# Patient Record
Sex: FEMALE | Race: WHITE | Employment: OTHER
[De-identification: names, ages, dates, MRNs, and addresses within clinical notes are randomized per-mention and may not be internally consistent; named-entity substitution may affect disease eponyms.]

---

## 2017-03-01 ENCOUNTER — OFFICE VISIT (OUTPATIENT)
Facility: CLINIC | Age: 62
End: 2017-03-01

## 2017-03-01 VITALS
RESPIRATION RATE: 16 BRPM | TEMPERATURE: 98.1 F | SYSTOLIC BLOOD PRESSURE: 120 MMHG | DIASTOLIC BLOOD PRESSURE: 80 MMHG | HEART RATE: 75 BPM | OXYGEN SATURATION: 99 %

## 2017-03-01 DIAGNOSIS — J02.9 SORE THROAT: Primary | ICD-10-CM

## 2017-03-01 LAB — S PYO AG THROAT QL: NORMAL

## 2017-03-01 PROCEDURE — 87880 STREP A ASSAY W/OPTIC: CPT | Performed by: NURSE PRACTITIONER

## 2017-03-01 PROCEDURE — 99213 OFFICE O/P EST LOW 20 MIN: CPT | Performed by: NURSE PRACTITIONER

## 2017-03-01 RX ORDER — DOXYCYCLINE HYCLATE 100 MG
100 TABLET ORAL 2 TIMES DAILY
Qty: 20 TABLET | Refills: 0 | Status: SHIPPED | OUTPATIENT
Start: 2017-03-01 | End: 2018-05-31 | Stop reason: SDUPTHER

## 2017-03-01 RX ORDER — DOXYCYCLINE HYCLATE 100 MG
100 TABLET ORAL 2 TIMES DAILY
Qty: 20 TABLET | Refills: 0 | Status: SHIPPED | OUTPATIENT
Start: 2017-03-01 | End: 2017-03-01

## 2017-11-28 ENCOUNTER — HOSPITAL ENCOUNTER (OUTPATIENT)
Age: 62
Setting detail: SPECIMEN
Discharge: HOME | End: 2017-11-28
Payer: COMMERCIAL

## 2017-11-28 ENCOUNTER — HOSPITAL ENCOUNTER (OUTPATIENT)
Dept: MRI IMAGING | Facility: CLINIC | Age: 62
Discharge: HOME | End: 2017-11-28
Payer: COMMERCIAL

## 2017-11-28 DIAGNOSIS — E78.00 PURE HYPERCHOLESTEROLEMIA: ICD-10-CM

## 2017-11-28 DIAGNOSIS — E03.9 HYPOTHYROIDISM, UNSPECIFIED TYPE: ICD-10-CM

## 2017-11-28 DIAGNOSIS — D70.9 NEUTROPENIA, UNSPECIFIED TYPE (HCC): ICD-10-CM

## 2017-11-28 DIAGNOSIS — M25.552 LEFT HIP PAIN: ICD-10-CM

## 2017-11-28 LAB
ALBUMIN SERPL-MCNC: 4.2 G/DL (ref 3.5–5.2)
ALBUMIN/GLOBULIN RATIO: 1.4 (ref 1–2.5)
ALP BLD-CCNC: 45 U/L (ref 35–104)
ALT SERPL-CCNC: 12 U/L (ref 5–33)
ANION GAP SERPL CALCULATED.3IONS-SCNC: 8 MMOL/L (ref 9–17)
AST SERPL-CCNC: 14 U/L
BILIRUB SERPL-MCNC: 0.42 MG/DL (ref 0.3–1.2)
BUN BLDV-MCNC: 17 MG/DL (ref 8–23)
BUN/CREAT BLD: ABNORMAL (ref 9–20)
CALCIUM SERPL-MCNC: 9.2 MG/DL (ref 8.6–10.4)
CHLORIDE BLD-SCNC: 101 MMOL/L (ref 98–107)
CHOLESTEROL/HDL RATIO: 3.2
CHOLESTEROL: 187 MG/DL
CO2: 29 MMOL/L (ref 20–31)
CREAT SERPL-MCNC: 0.82 MG/DL (ref 0.5–0.9)
GFR AFRICAN AMERICAN: >60 ML/MIN
GFR NON-AFRICAN AMERICAN: >60 ML/MIN
GFR SERPL CREATININE-BSD FRML MDRD: ABNORMAL ML/MIN/{1.73_M2}
GFR SERPL CREATININE-BSD FRML MDRD: ABNORMAL ML/MIN/{1.73_M2}
GLUCOSE BLD-MCNC: 86 MG/DL (ref 70–99)
HCT VFR BLD CALC: 40.1 % (ref 36.3–47.1)
HDLC SERPL-MCNC: 58 MG/DL
HEMOGLOBIN: 12.3 G/DL (ref 11.9–15.1)
LDL CHOLESTEROL: 114 MG/DL (ref 0–130)
MCH RBC QN AUTO: 28.9 PG (ref 25.2–33.5)
MCHC RBC AUTO-ENTMCNC: 30.7 G/DL (ref 28.4–34.8)
MCV RBC AUTO: 94.4 FL (ref 82.6–102.9)
PDW BLD-RTO: 13 % (ref 11.8–14.4)
PLATELET # BLD: 352 K/UL (ref 138–453)
PMV BLD AUTO: 9.4 FL (ref 8.1–13.5)
POTASSIUM SERPL-SCNC: 4.3 MMOL/L (ref 3.7–5.3)
RBC # BLD: 4.25 M/UL (ref 3.95–5.11)
SODIUM BLD-SCNC: 138 MMOL/L (ref 135–144)
THYROXINE, FREE: 1.39 NG/DL (ref 0.93–1.7)
TOTAL PROTEIN: 7.3 G/DL (ref 6.4–8.3)
TRIGL SERPL-MCNC: 73 MG/DL
TSH SERPL DL<=0.05 MIU/L-ACNC: 7.1 MIU/L (ref 0.3–5)
VLDLC SERPL CALC-MCNC: NORMAL MG/DL (ref 1–30)
WBC # BLD: 4.9 K/UL (ref 3.5–11.3)

## 2017-11-28 PROCEDURE — 73721 MRI JNT OF LWR EXTRE W/O DYE: CPT

## 2017-11-28 PROCEDURE — 72148 MRI LUMBAR SPINE W/O DYE: CPT

## 2017-12-22 ENCOUNTER — HOSPITAL ENCOUNTER (OUTPATIENT)
Dept: WOMENS IMAGING | Age: 62
Discharge: HOME | End: 2017-12-22
Payer: COMMERCIAL

## 2017-12-22 DIAGNOSIS — Z12.31 VISIT FOR SCREENING MAMMOGRAM: ICD-10-CM

## 2017-12-22 PROCEDURE — 77063 BREAST TOMOSYNTHESIS BI: CPT

## 2018-01-04 ENCOUNTER — OFFICE VISIT (OUTPATIENT)
Dept: ORTHOPEDIC SURGERY | Age: 63
End: 2018-01-04
Payer: COMMERCIAL

## 2018-01-04 VITALS
HEIGHT: 66 IN | HEART RATE: 71 BPM | BODY MASS INDEX: 24.91 KG/M2 | WEIGHT: 155 LBS | DIASTOLIC BLOOD PRESSURE: 87 MMHG | SYSTOLIC BLOOD PRESSURE: 133 MMHG | RESPIRATION RATE: 18 BRPM

## 2018-01-04 DIAGNOSIS — M25.552 LEFT HIP PAIN: Primary | ICD-10-CM

## 2018-01-04 PROCEDURE — 99203 OFFICE O/P NEW LOW 30 MIN: CPT | Performed by: ORTHOPAEDIC SURGERY

## 2018-01-12 ENCOUNTER — HOSPITAL ENCOUNTER (OUTPATIENT)
Dept: INTERVENTIONAL RADIOLOGY/VASCULAR | Age: 63
Discharge: HOME | End: 2018-01-12
Payer: COMMERCIAL

## 2018-01-12 DIAGNOSIS — M25.552 LEFT HIP PAIN: ICD-10-CM

## 2018-01-12 PROCEDURE — 77002 NEEDLE LOCALIZATION BY XRAY: CPT

## 2018-01-12 PROCEDURE — 20610 DRAIN/INJ JOINT/BURSA W/O US: CPT

## 2018-01-12 PROCEDURE — 6360000004 HC RX CONTRAST MEDICATION: Performed by: ORTHOPAEDIC SURGERY

## 2018-01-12 PROCEDURE — 2500000003 HC RX 250 WO HCPCS: Performed by: ORTHOPAEDIC SURGERY

## 2018-01-12 PROCEDURE — 6360000002 HC RX W HCPCS: Performed by: ORTHOPAEDIC SURGERY

## 2018-01-12 RX ORDER — BUPIVACAINE HYDROCHLORIDE 5 MG/ML
2 INJECTION, SOLUTION EPIDURAL; INTRACAUDAL ONCE
Status: COMPLETED | OUTPATIENT
Start: 2018-01-12 | End: 2018-01-12

## 2018-01-12 RX ORDER — LIDOCAINE HYDROCHLORIDE 10 MG/ML
2 INJECTION, SOLUTION EPIDURAL; INFILTRATION; INTRACAUDAL; PERINEURAL ONCE
Status: COMPLETED | OUTPATIENT
Start: 2018-01-12 | End: 2018-01-12

## 2018-01-12 RX ORDER — METHYLPREDNISOLONE ACETATE 80 MG/ML
80 INJECTION, SUSPENSION INTRA-ARTICULAR; INTRALESIONAL; INTRAMUSCULAR; SOFT TISSUE ONCE
Status: COMPLETED | OUTPATIENT
Start: 2018-01-12 | End: 2018-01-12

## 2018-01-12 RX ADMIN — IOPAMIDOL 50 ML: 510 INJECTION, SOLUTION INTRAVASCULAR at 15:43

## 2018-01-12 RX ADMIN — METHYLPREDNISOLONE ACETATE 80 MG: 80 INJECTION, SUSPENSION INTRA-ARTICULAR; INTRALESIONAL; INTRAMUSCULAR; SOFT TISSUE at 15:25

## 2018-01-12 RX ADMIN — BUPIVACAINE HYDROCHLORIDE 10 MG: 5 INJECTION, SOLUTION EPIDURAL; INTRACAUDAL at 15:25

## 2018-01-12 RX ADMIN — LIDOCAINE HYDROCHLORIDE 2 ML: 10 INJECTION, SOLUTION EPIDURAL; INFILTRATION; INTRACAUDAL; PERINEURAL at 15:25

## 2018-01-12 NOTE — BRIEF OP NOTE
Brief Postoperative Note    Delmi Salas  YOB: 1955  [id number]    Pre-operative Diagnosis: Left hip pain     Post-operative Diagnosis: Same    Procedure: Fluoroscopic guided left hip injection     Anesthesia: Local    Surgeons/Assistants: Whitley Maynard MD    Estimated Blood Loss: less than 50     Complications: None    Specimens: Was Not Obtained    Findings: 1 mL of Depo-Medrol 80 mg/mL, 2 mL of xylocaine 1% and 2 mL of Bupivacaine 0.5 % injected into the left hip joint     Electronically signed by Whitley Maynard MD on 1/12/2018 at 3:32 PM

## 2018-02-20 ENCOUNTER — HOSPITAL ENCOUNTER (OUTPATIENT)
Age: 63
Setting detail: SPECIMEN
Discharge: HOME | End: 2018-02-20
Payer: COMMERCIAL

## 2018-02-20 DIAGNOSIS — E03.9 HYPOTHYROIDISM, UNSPECIFIED TYPE: ICD-10-CM

## 2018-02-20 LAB
THYROXINE, FREE: 1.49 NG/DL (ref 0.93–1.7)
TSH SERPL DL<=0.05 MIU/L-ACNC: 1.62 MIU/L (ref 0.3–5)

## 2019-02-22 ENCOUNTER — HOSPITAL ENCOUNTER (OUTPATIENT)
Age: 64
Setting detail: SPECIMEN
Discharge: HOME | End: 2019-02-22
Payer: COMMERCIAL

## 2019-02-22 DIAGNOSIS — Z13.9 SCREENING FOR CONDITION: ICD-10-CM

## 2019-02-22 DIAGNOSIS — D70.9 NEUTROPENIA, UNSPECIFIED TYPE (HCC): ICD-10-CM

## 2019-02-22 DIAGNOSIS — E78.00 PURE HYPERCHOLESTEROLEMIA: ICD-10-CM

## 2019-02-22 DIAGNOSIS — E03.9 HYPOTHYROIDISM, UNSPECIFIED TYPE: ICD-10-CM

## 2019-02-22 LAB
ALBUMIN SERPL-MCNC: 4.2 G/DL (ref 3.5–5.2)
ALBUMIN/GLOBULIN RATIO: 1.3 (ref 1–2.5)
ALP BLD-CCNC: 48 U/L (ref 35–104)
ALT SERPL-CCNC: 12 U/L (ref 5–33)
ANION GAP SERPL CALCULATED.3IONS-SCNC: 13 MMOL/L (ref 9–17)
AST SERPL-CCNC: 14 U/L
BILIRUB SERPL-MCNC: 0.36 MG/DL (ref 0.3–1.2)
BUN BLDV-MCNC: 20 MG/DL (ref 8–23)
BUN/CREAT BLD: NORMAL (ref 9–20)
CALCIUM SERPL-MCNC: 9.4 MG/DL (ref 8.6–10.4)
CHLORIDE BLD-SCNC: 104 MMOL/L (ref 98–107)
CHOLESTEROL/HDL RATIO: 3.1
CHOLESTEROL: 197 MG/DL
CO2: 24 MMOL/L (ref 20–31)
CREAT SERPL-MCNC: 0.8 MG/DL (ref 0.5–0.9)
ESTIMATED AVERAGE GLUCOSE: 100 MG/DL
GFR AFRICAN AMERICAN: >60 ML/MIN
GFR NON-AFRICAN AMERICAN: >60 ML/MIN
GFR SERPL CREATININE-BSD FRML MDRD: NORMAL ML/MIN/{1.73_M2}
GFR SERPL CREATININE-BSD FRML MDRD: NORMAL ML/MIN/{1.73_M2}
GLUCOSE BLD-MCNC: 84 MG/DL (ref 70–99)
HBA1C MFR BLD: 5.1 % (ref 4–6)
HCT VFR BLD CALC: 40.5 % (ref 36.3–47.1)
HDLC SERPL-MCNC: 63 MG/DL
HEMOGLOBIN: 13 G/DL (ref 11.9–15.1)
LDL CHOLESTEROL: 123 MG/DL (ref 0–130)
MCH RBC QN AUTO: 30.4 PG (ref 25.2–33.5)
MCHC RBC AUTO-ENTMCNC: 32.1 G/DL (ref 28.4–34.8)
MCV RBC AUTO: 94.8 FL (ref 82.6–102.9)
NRBC AUTOMATED: 0 PER 100 WBC
PDW BLD-RTO: 13.2 % (ref 11.8–14.4)
PLATELET # BLD: 357 K/UL (ref 138–453)
PMV BLD AUTO: 9.6 FL (ref 8.1–13.5)
POTASSIUM SERPL-SCNC: 4.3 MMOL/L (ref 3.7–5.3)
RBC # BLD: 4.27 M/UL (ref 3.95–5.11)
SODIUM BLD-SCNC: 141 MMOL/L (ref 135–144)
THYROXINE, FREE: 1.57 NG/DL (ref 0.93–1.7)
TOTAL PROTEIN: 7.4 G/DL (ref 6.4–8.3)
TRIGL SERPL-MCNC: 54 MG/DL
TSH SERPL DL<=0.05 MIU/L-ACNC: 1.7 MIU/L (ref 0.3–5)
VLDLC SERPL CALC-MCNC: NORMAL MG/DL (ref 1–30)
WBC # BLD: 4.4 K/UL (ref 3.5–11.3)

## 2019-02-26 ENCOUNTER — HOSPITAL ENCOUNTER (OUTPATIENT)
Dept: GENERAL RADIOLOGY | Facility: CLINIC | Age: 64
Discharge: HOME | End: 2019-02-28
Payer: COMMERCIAL

## 2019-02-26 ENCOUNTER — HOSPITAL ENCOUNTER (OUTPATIENT)
Facility: CLINIC | Age: 64
Discharge: HOME | End: 2019-02-28
Payer: COMMERCIAL

## 2019-02-26 DIAGNOSIS — Z13.9 SCREENING FOR CONDITION: ICD-10-CM

## 2019-02-26 PROBLEM — G47.00 INSOMNIA: Status: ACTIVE | Noted: 2019-02-26

## 2019-02-26 PROCEDURE — 71046 X-RAY EXAM CHEST 2 VIEWS: CPT

## 2019-11-07 ENCOUNTER — HOSPITAL ENCOUNTER (OUTPATIENT)
Age: 64
Setting detail: SPECIMEN
Discharge: HOME | End: 2019-11-07
Payer: COMMERCIAL

## 2019-11-07 DIAGNOSIS — E03.9 HYPOTHYROIDISM, UNSPECIFIED TYPE: ICD-10-CM

## 2019-11-07 LAB
THYROXINE, FREE: 1.42 NG/DL (ref 0.93–1.7)
TSH SERPL DL<=0.05 MIU/L-ACNC: 4.74 MIU/L (ref 0.3–5)

## 2020-03-04 ENCOUNTER — HOSPITAL ENCOUNTER (OUTPATIENT)
Age: 65
Setting detail: SPECIMEN
Discharge: HOME | End: 2020-03-04
Payer: COMMERCIAL

## 2020-03-04 LAB
ALBUMIN SERPL-MCNC: 4.1 G/DL (ref 3.5–5.2)
ALBUMIN/GLOBULIN RATIO: 1.2 (ref 1–2.5)
ALP BLD-CCNC: 45 U/L (ref 35–104)
ALT SERPL-CCNC: 12 U/L (ref 5–33)
ANION GAP SERPL CALCULATED.3IONS-SCNC: 10 MMOL/L (ref 9–17)
AST SERPL-CCNC: 13 U/L
BILIRUB SERPL-MCNC: 0.3 MG/DL (ref 0.3–1.2)
BUN BLDV-MCNC: 20 MG/DL (ref 8–23)
BUN/CREAT BLD: NORMAL (ref 9–20)
CALCIUM SERPL-MCNC: 9.7 MG/DL (ref 8.6–10.4)
CHLORIDE BLD-SCNC: 103 MMOL/L (ref 98–107)
CHOLESTEROL/HDL RATIO: 3.3
CHOLESTEROL: 197 MG/DL
CO2: 27 MMOL/L (ref 20–31)
CREAT SERPL-MCNC: 0.73 MG/DL (ref 0.5–0.9)
GFR AFRICAN AMERICAN: >60 ML/MIN
GFR NON-AFRICAN AMERICAN: >60 ML/MIN
GFR SERPL CREATININE-BSD FRML MDRD: NORMAL ML/MIN/{1.73_M2}
GFR SERPL CREATININE-BSD FRML MDRD: NORMAL ML/MIN/{1.73_M2}
GLUCOSE BLD-MCNC: 84 MG/DL (ref 70–99)
HCT VFR BLD CALC: 41.4 % (ref 36.3–47.1)
HDLC SERPL-MCNC: 59 MG/DL
HEMOGLOBIN: 12.9 G/DL (ref 11.9–15.1)
LDL CHOLESTEROL: 121 MG/DL (ref 0–130)
MCH RBC QN AUTO: 29.4 PG (ref 25.2–33.5)
MCHC RBC AUTO-ENTMCNC: 31.2 G/DL (ref 28.4–34.8)
MCV RBC AUTO: 94.3 FL (ref 82.6–102.9)
NRBC AUTOMATED: 0 PER 100 WBC
PDW BLD-RTO: 13.1 % (ref 11.8–14.4)
PLATELET # BLD: 365 K/UL (ref 138–453)
PMV BLD AUTO: 9.6 FL (ref 8.1–13.5)
POTASSIUM SERPL-SCNC: 4.5 MMOL/L (ref 3.7–5.3)
RBC # BLD: 4.39 M/UL (ref 3.95–5.11)
SODIUM BLD-SCNC: 140 MMOL/L (ref 135–144)
THYROXINE, FREE: 1.42 NG/DL (ref 0.93–1.7)
TOTAL PROTEIN: 7.5 G/DL (ref 6.4–8.3)
TRIGL SERPL-MCNC: 87 MG/DL
TSH SERPL DL<=0.05 MIU/L-ACNC: 4.85 MIU/L (ref 0.3–5)
VLDLC SERPL CALC-MCNC: NORMAL MG/DL (ref 1–30)
WBC # BLD: 4.3 K/UL (ref 3.5–11.3)

## 2020-05-13 ENCOUNTER — OFFICE VISIT (OUTPATIENT)
Dept: PODIATRY | Age: 65
End: 2020-05-13
Payer: COMMERCIAL

## 2020-05-13 VITALS — TEMPERATURE: 97.5 F | BODY MASS INDEX: 24.11 KG/M2 | HEIGHT: 66 IN | WEIGHT: 150 LBS

## 2020-05-13 PROCEDURE — L3020 FOOT LONGITUD/METATARSAL SUP: HCPCS | Performed by: PODIATRIST

## 2020-05-13 PROCEDURE — 99203 OFFICE O/P NEW LOW 30 MIN: CPT | Performed by: PODIATRIST

## 2020-05-13 RX ORDER — LEVOTHYROXINE SODIUM 112 MCG
TABLET ORAL
COMMUNITY
Start: 2020-03-07 | End: 2020-06-16 | Stop reason: SDUPTHER

## 2020-05-13 RX ORDER — METHYLPREDNISOLONE 4 MG/1
TABLET ORAL
Qty: 1 TABLET | Refills: 0 | Status: SHIPPED | OUTPATIENT
Start: 2020-05-13 | End: 2021-09-16 | Stop reason: SDUPTHER

## 2020-05-13 NOTE — PROGRESS NOTES
[address] [facility] [address] [id number] [facility] [address]  Dept: [phone]    NEW PATIENT PROGRESS NOTE  Date of patient's visit: 5/13/2020  Patient's Name:  Anai Oh YOB: 1955            Patient Care Team:  DEE Blackmon CNP as PCP - General (Nurse Practitioner)  DEE Blackmon CNP as PCP - [facility]  Salvador Elizondo MD as Consulting Physician (Gynecology)        Chief Complaint   Patient presents with    New Patient    Foot Pain     bl heel          HPI:   Anai Oh is a 63 y.o. female who presents to the office today complaining of bilateral heel pain . Symptoms began 2 month(s) ago. Patient relates pain is Present. Pain is rated 2 out of 10 and is described as constant. Treatments prior to today's visit include: none. Currently denies F/C/N/V. Pt's primary care physician is DEE Blackmon CNP last seen November 7 2019 patient states she is also having pain at the ball of her feet as well. 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 She states her feet have been hurting more now that she is off of work. She states she also is having some numbness and tingling in both feet. Allergies   Allergen Reactions    Bee Venom      Other reaction(s): Unknown    Fexofenadine-Pseudoephed Er Palpitations       Past Medical History:   Diagnosis Date    Allergic rhinitis     Anemia     iron deficiency    Anxiety     ESBL (extended spectrum beta-lactamase) producing bacteria infection 8/15/2014    E. Coli urine    Hyperlipidemia     mild    Hypothyroidism     Insomnia     MVP (mitral valve prolapse)     Osteopenia     Postmenopausal        Prior to Admission medications    Medication Sig Start Date End Date Taking?  Authorizing Provider   SYNTHROID 112 MCG tablet  3/7/20  Yes Historical Provider, MD   temazepam (RESTORIL) 15 MG capsule Take 1 capsule by mouth nightly as needed over the object to massage the bottom of your foot. 4. If you want to do ice massage, fill a water bottle about three-fourths of the way full and freeze before using. 5. Continue for 2 to 5 minutes. Bilateral calf stretch (knees straight)    1. Place a book on the floor a few inches from a wall or countertop, and put the balls of your feet on it. Your heels should be on the floor. The book needs to be thick enough so that you can feel a gentle stretch in each calf. If you are not steady on your feet, hold on to a chair, counter, or wall while you do this stretch. 2. Keep your knees straight, and lean forward until you feel a stretch in each calf. 3. To get more stretch, add another book or use a thicker book, such as a phone book, a dictionary, or an encyclopedia. 4. Hold the stretch for at least 15 to 30 seconds. 5. Repeat 2 to 4 times. Bilateral calf stretch (knees bent)    1. Place a book on the floor a few inches from a wall or countertop, and put the balls of your feet on it. Your heels should be on the floor. The book needs to be thick enough so that you can feel a gentle stretch in each calf. If you are not steady on your feet, hold on to a chair, counter, or wall while you do this stretch. 2. Bend your knees, and lean forward until you feel a stretch in each calf. 3. To get more stretch, add another book or use a thicker book, such as a phone book, a dictionary, or an encyclopedia. 4. Hold the stretch for at least 15 to 30 seconds. 5. Repeat 2 to 4 times. Martin pick-ups    1. Put some marbles on the floor next to a cup.  2. Sit down, and use the toes of your affected foot to lift up one marble from the floor at a time. Then try to put the marble in the cup.  3. Repeat 8 to 12 times. Towel scrunches    1. Sit down, and place your affected foot on a towel on the floor. You may also do this with both feet on the towel. 2. Scrunch the towel toward you with your toes.  Then use your

## 2020-05-15 ENCOUNTER — HOSPITAL ENCOUNTER (OUTPATIENT)
Age: 65
Setting detail: SPECIMEN
Discharge: HOME | End: 2020-05-15
Payer: COMMERCIAL

## 2020-05-15 LAB — SEDIMENTATION RATE, ERYTHROCYTE: 26 MM (ref 0–20)

## 2020-05-18 LAB
ANTI-NUCLEAR ANTIBODY (ANA): ABNORMAL
CCP IGG ANTIBODIES: <1.5 U/ML

## 2020-05-21 ENCOUNTER — HOSPITAL ENCOUNTER (OUTPATIENT)
Dept: NON INVASIVE DIAGNOSTICS | Age: 65
Discharge: HOME | End: 2020-05-21
Payer: COMMERCIAL

## 2020-05-21 LAB
LV EF: 65 %
LVEF MODALITY: NORMAL

## 2020-05-21 PROCEDURE — 93306 TTE W/DOPPLER COMPLETE: CPT

## 2020-06-10 ENCOUNTER — OFFICE VISIT (OUTPATIENT)
Dept: ORTHOPEDIC SURGERY | Age: 65
End: 2020-06-10
Payer: COMMERCIAL

## 2020-06-10 PROCEDURE — 99213 OFFICE O/P EST LOW 20 MIN: CPT | Performed by: ORTHOPAEDIC SURGERY

## 2020-06-10 NOTE — PROGRESS NOTES
2. YISEL of left hip        This is a 63 y.o. female who presents to the clinic today for follow up left hip pain. Patient did well with her last IR injection to the left hip about two years ago so we will send her for another IR injection of the left hip. She was advised to call with any new or worsening symptoms. Past History:    Current Outpatient Medications:     SYNTHROID 112 MCG tablet, , Disp: , Rfl:     temazepam (RESTORIL) 15 MG capsule, Take 1 capsule by mouth nightly as needed for Sleep for up to 90 days. , Disp: 90 capsule, Rfl: 0    ferrous sulfate (FE TABS) 325 (65 FE) MG EC tablet, Take 1 tablet by mouth daily (with breakfast), Disp: 30 tablet, Rfl: 3    Fexofenadine HCl (ALLEGRA PO), Take  by mouth., Disp: , Rfl:     magnesium 30 MG tablet, Take 30 mg by mouth daily. , Disp: , Rfl:     Multiple Vitamin (MULTIVITAMIN PO), Take  by mouth daily. , Disp: , Rfl:     Cholecalciferol (VITAMIN D PO), Take  by mouth daily. , Disp: , Rfl:     POTASSIUM PO, Take  by mouth daily.   , Disp: , Rfl:   Allergies   Allergen Reactions    Bee Venom      Other reaction(s): Unknown    Fexofenadine-Pseudoephed Er Palpitations     Social History     Socioeconomic History    Marital status:      Spouse name: Not on file    Number of children: Not on file    Years of education: Not on file    Highest education level: Not on file   Occupational History    Not on file   Social Needs    Financial resource strain: Not on file    Food insecurity     Worry: Not on file     Inability: Not on file    Transportation needs     Medical: Not on file     Non-medical: Not on file   Tobacco Use    Smoking status: Never Smoker    Smokeless tobacco: Never Used   Substance and Sexual Activity    Alcohol use: Yes     Comment: occasional    Drug use: No    Sexual activity: Not on file   Lifestyle    Physical activity     Days per week: Not on file     Minutes per session: Not on file    Stress: Not on file

## 2020-06-15 ENCOUNTER — TELEPHONE (OUTPATIENT)
Dept: INTERVENTIONAL RADIOLOGY/VASCULAR | Age: 65
End: 2020-06-15

## 2020-06-18 ENCOUNTER — TELEPHONE (OUTPATIENT)
Dept: INTERVENTIONAL RADIOLOGY/VASCULAR | Age: 65
End: 2020-06-18

## 2020-06-24 ENCOUNTER — TELEPHONE (OUTPATIENT)
Dept: INTERVENTIONAL RADIOLOGY/VASCULAR | Age: 65
End: 2020-06-24

## 2021-02-11 RX ORDER — BUPIVACAINE HYDROCHLORIDE 5 MG/ML
4 INJECTION, SOLUTION EPIDURAL; INTRACAUDAL ONCE
Status: CANCELLED | OUTPATIENT
Start: 2021-02-12

## 2021-02-11 RX ORDER — METHYLPREDNISOLONE ACETATE 80 MG/ML
80 INJECTION, SUSPENSION INTRA-ARTICULAR; INTRALESIONAL; INTRAMUSCULAR; SOFT TISSUE ONCE
Status: CANCELLED | OUTPATIENT
Start: 2021-02-12

## 2021-02-11 RX ORDER — LIDOCAINE HYDROCHLORIDE 10 MG/ML
4 INJECTION, SOLUTION EPIDURAL; INFILTRATION; INTRACAUDAL; PERINEURAL ONCE
Status: CANCELLED | OUTPATIENT
Start: 2021-02-12

## 2021-02-12 ENCOUNTER — HOSPITAL ENCOUNTER (OUTPATIENT)
Dept: INTERVENTIONAL RADIOLOGY/VASCULAR | Age: 66
Discharge: HOME | End: 2021-02-14
Payer: COMMERCIAL

## 2021-02-12 DIAGNOSIS — M25.552 HIP PAIN, LEFT: ICD-10-CM

## 2021-02-12 PROCEDURE — 77002 NEEDLE LOCALIZATION BY XRAY: CPT | Performed by: RADIOLOGY

## 2021-02-12 PROCEDURE — 6360000002 HC RX W HCPCS: Performed by: ORTHOPAEDIC SURGERY

## 2021-02-12 PROCEDURE — 6360000004 HC RX CONTRAST MEDICATION: Performed by: ORTHOPAEDIC SURGERY

## 2021-02-12 PROCEDURE — 20610 DRAIN/INJ JOINT/BURSA W/O US: CPT | Performed by: RADIOLOGY

## 2021-02-12 PROCEDURE — 2709999900 IR ARTHR/ASP/INJ MAJOR JT/BURSA LEFT WO US

## 2021-02-12 PROCEDURE — 2500000003 HC RX 250 WO HCPCS: Performed by: ORTHOPAEDIC SURGERY

## 2021-02-12 RX ORDER — BUPIVACAINE HYDROCHLORIDE 5 MG/ML
4 INJECTION, SOLUTION EPIDURAL; INTRACAUDAL ONCE
Status: COMPLETED | OUTPATIENT
Start: 2021-02-12 | End: 2021-02-12

## 2021-02-12 RX ORDER — LIDOCAINE HYDROCHLORIDE 10 MG/ML
4 INJECTION, SOLUTION EPIDURAL; INFILTRATION; INTRACAUDAL; PERINEURAL ONCE
Status: COMPLETED | OUTPATIENT
Start: 2021-02-12 | End: 2021-02-12

## 2021-02-12 RX ORDER — ACETAMINOPHEN 325 MG/1
650 TABLET ORAL EVERY 4 HOURS PRN
Status: DISCONTINUED | OUTPATIENT
Start: 2021-02-12 | End: 2021-02-15 | Stop reason: HOSPADM

## 2021-02-12 RX ORDER — METHYLPREDNISOLONE ACETATE 80 MG/ML
80 INJECTION, SUSPENSION INTRA-ARTICULAR; INTRALESIONAL; INTRAMUSCULAR; SOFT TISSUE ONCE
Status: COMPLETED | OUTPATIENT
Start: 2021-02-12 | End: 2021-02-12

## 2021-02-12 RX ADMIN — METHYLPREDNISOLONE ACETATE 80 MG: 80 INJECTION, SUSPENSION INTRA-ARTICULAR; INTRALESIONAL; INTRAMUSCULAR; SOFT TISSUE at 10:10

## 2021-02-12 RX ADMIN — BUPIVACAINE HYDROCHLORIDE 20 MG: 5 INJECTION, SOLUTION EPIDURAL; INTRACAUDAL; PERINEURAL at 11:31

## 2021-02-12 RX ADMIN — LIDOCAINE HYDROCHLORIDE 4 ML: 10 INJECTION, SOLUTION EPIDURAL; INFILTRATION; INTRACAUDAL; PERINEURAL at 10:10

## 2021-02-12 RX ADMIN — IOHEXOL 50 ML: 240 INJECTION, SOLUTION INTRATHECAL; INTRAVASCULAR; INTRAVENOUS; ORAL at 10:31

## 2021-02-12 ASSESSMENT — PAIN SCALES - GENERAL: PAINLEVEL_OUTOF10: 0

## 2021-02-12 NOTE — BRIEF OP NOTE
Brief Postoperative Note    Diana Yousif  YOB: 1955  [id number]    Pre-operative Diagnosis: left hip pain    Post-operative Diagnosis: Same    Procedure: Left hip inj for pain    Medications Given: none    Anesthesia: Local    Surgeons/Assistants: Carlin Toney MD    Estimated Blood Loss: minimal    Complications: none    Specimens: were not obtained    Findings: Combo anesthetics and steroid inj into left hip joint under fluoro without incident. Pt tolerated well.       Electronically signed by Carlin Toney on 2/12/2021 at 10:38 AM

## 2021-02-17 ENCOUNTER — HOSPITAL ENCOUNTER (OUTPATIENT)
Age: 66
Setting detail: SPECIMEN
Discharge: HOME | End: 2021-02-17
Payer: COMMERCIAL

## 2021-02-17 DIAGNOSIS — E03.9 HYPOTHYROIDISM, UNSPECIFIED TYPE: ICD-10-CM

## 2021-02-17 DIAGNOSIS — E78.00 PURE HYPERCHOLESTEROLEMIA: ICD-10-CM

## 2021-02-17 DIAGNOSIS — R76.8 ANA POSITIVE: ICD-10-CM

## 2021-02-17 DIAGNOSIS — R73.9 HYPERGLYCEMIA: ICD-10-CM

## 2021-02-17 DIAGNOSIS — D70.9 NEUTROPENIA, UNSPECIFIED TYPE (HCC): ICD-10-CM

## 2021-02-17 DIAGNOSIS — D64.9 ANEMIA, UNSPECIFIED TYPE: ICD-10-CM

## 2021-02-17 LAB
ALBUMIN SERPL-MCNC: 4 G/DL (ref 3.5–5.2)
ALBUMIN/GLOBULIN RATIO: 1.2 (ref 1–2.5)
ALP BLD-CCNC: 43 U/L (ref 35–104)
ALT SERPL-CCNC: 9 U/L (ref 5–33)
ANION GAP SERPL CALCULATED.3IONS-SCNC: 10 MMOL/L (ref 9–17)
AST SERPL-CCNC: 12 U/L
BILIRUB SERPL-MCNC: 0.31 MG/DL (ref 0.3–1.2)
BUN BLDV-MCNC: 19 MG/DL (ref 8–23)
BUN/CREAT BLD: NORMAL (ref 9–20)
CALCIUM SERPL-MCNC: 9.6 MG/DL (ref 8.6–10.4)
CHLORIDE BLD-SCNC: 104 MMOL/L (ref 98–107)
CHOLESTEROL/HDL RATIO: 3.2
CHOLESTEROL: 193 MG/DL
CO2: 28 MMOL/L (ref 20–31)
CREAT SERPL-MCNC: 0.9 MG/DL (ref 0.5–0.9)
ESTIMATED AVERAGE GLUCOSE: 105 MG/DL
GFR AFRICAN AMERICAN: >60 ML/MIN
GFR NON-AFRICAN AMERICAN: >60 ML/MIN
GFR SERPL CREATININE-BSD FRML MDRD: NORMAL ML/MIN/{1.73_M2}
GFR SERPL CREATININE-BSD FRML MDRD: NORMAL ML/MIN/{1.73_M2}
GLUCOSE BLD-MCNC: 71 MG/DL (ref 70–99)
HBA1C MFR BLD: 5.3 % (ref 4–6)
HCT VFR BLD CALC: 43.1 % (ref 36.3–47.1)
HDLC SERPL-MCNC: 61 MG/DL
HEMOGLOBIN: 13.2 G/DL (ref 11.9–15.1)
IRON: 94 UG/DL (ref 37–145)
LDL CHOLESTEROL: 108 MG/DL (ref 0–130)
MCH RBC QN AUTO: 29.4 PG (ref 25.2–33.5)
MCHC RBC AUTO-ENTMCNC: 30.6 G/DL (ref 28.4–34.8)
MCV RBC AUTO: 96 FL (ref 82.6–102.9)
NRBC AUTOMATED: 0 PER 100 WBC
PDW BLD-RTO: 13.4 % (ref 11.8–14.4)
PLATELET # BLD: 406 K/UL (ref 138–453)
PMV BLD AUTO: 9.5 FL (ref 8.1–13.5)
POTASSIUM SERPL-SCNC: 4.7 MMOL/L (ref 3.7–5.3)
RBC # BLD: 4.49 M/UL (ref 3.95–5.11)
SODIUM BLD-SCNC: 142 MMOL/L (ref 135–144)
THYROXINE, FREE: 1.47 NG/DL (ref 0.93–1.7)
TOTAL PROTEIN: 7.3 G/DL (ref 6.4–8.3)
TRIGL SERPL-MCNC: 119 MG/DL
TSH SERPL DL<=0.05 MIU/L-ACNC: 7.76 MIU/L (ref 0.3–5)
VLDLC SERPL CALC-MCNC: NORMAL MG/DL (ref 1–30)
WBC # BLD: 6.6 K/UL (ref 3.5–11.3)

## 2021-02-18 LAB — ANTI-NUCLEAR ANTIBODY (ANA): NEGATIVE

## 2021-10-08 ENCOUNTER — HOSPITAL ENCOUNTER (OUTPATIENT)
Age: 66
Setting detail: SPECIMEN
Discharge: HOME | End: 2021-10-08
Payer: COMMERCIAL

## 2021-10-08 DIAGNOSIS — E03.9 HYPOTHYROIDISM, UNSPECIFIED TYPE: ICD-10-CM

## 2021-10-08 LAB
THYROXINE, FREE: 1.39 NG/DL (ref 0.93–1.7)
TSH SERPL DL<=0.05 MIU/L-ACNC: 2.43 MIU/L (ref 0.3–5)

## 2022-11-30 ENCOUNTER — HOSPITAL ENCOUNTER (OUTPATIENT)
Age: 67
Setting detail: SPECIMEN
Discharge: HOME | End: 2022-11-30

## 2022-11-30 DIAGNOSIS — E03.9 HYPOTHYROIDISM, UNSPECIFIED TYPE: ICD-10-CM

## 2022-11-30 DIAGNOSIS — R79.9 ABNORMAL FINDING OF BLOOD CHEMISTRY, UNSPECIFIED: ICD-10-CM

## 2022-11-30 DIAGNOSIS — R73.9 HYPERGLYCEMIA: ICD-10-CM

## 2022-11-30 DIAGNOSIS — D70.9 NEUTROPENIA, UNSPECIFIED TYPE (HCC): ICD-10-CM

## 2022-11-30 DIAGNOSIS — R00.2 HEART PALPITATIONS: ICD-10-CM

## 2022-11-30 DIAGNOSIS — E78.00 PURE HYPERCHOLESTEROLEMIA: ICD-10-CM

## 2022-11-30 LAB
ALBUMIN SERPL-MCNC: 4 G/DL (ref 3.5–5.2)
ALBUMIN/GLOBULIN RATIO: 1.3 (ref 1–2.5)
ALP BLD-CCNC: 45 U/L (ref 35–104)
ALT SERPL-CCNC: 9 U/L (ref 5–33)
ANION GAP SERPL CALCULATED.3IONS-SCNC: 13 MMOL/L (ref 9–17)
AST SERPL-CCNC: 14 U/L
BILIRUB SERPL-MCNC: 0.4 MG/DL (ref 0.3–1.2)
BUN BLDV-MCNC: 21 MG/DL (ref 8–23)
CALCIUM SERPL-MCNC: 9.2 MG/DL (ref 8.6–10.4)
CHLORIDE BLD-SCNC: 105 MMOL/L (ref 98–107)
CHOLESTEROL/HDL RATIO: 3.6
CHOLESTEROL: 193 MG/DL
CO2: 23 MMOL/L (ref 20–31)
CREAT SERPL-MCNC: 0.74 MG/DL (ref 0.5–0.9)
ESTIMATED AVERAGE GLUCOSE: 105 MG/DL
GFR SERPL CREATININE-BSD FRML MDRD: >60 ML/MIN/1.73M2
GLUCOSE BLD-MCNC: 69 MG/DL (ref 70–99)
HBA1C MFR BLD: 5.3 % (ref 4–6)
HCT VFR BLD CALC: 41.2 % (ref 36.3–47.1)
HDLC SERPL-MCNC: 53 MG/DL
HEMOGLOBIN: 12.5 G/DL (ref 11.9–15.1)
IRON: 95 UG/DL (ref 37–145)
LDL CHOLESTEROL: 125 MG/DL (ref 0–130)
MCH RBC QN AUTO: 29.5 PG (ref 25.2–33.5)
MCHC RBC AUTO-ENTMCNC: 30.3 G/DL (ref 28.4–34.8)
MCV RBC AUTO: 97.2 FL (ref 82.6–102.9)
NRBC AUTOMATED: 0 PER 100 WBC
PDW BLD-RTO: 13.9 % (ref 11.8–14.4)
PLATELET # BLD: 379 K/UL (ref 138–453)
PMV BLD AUTO: 9.8 FL (ref 8.1–13.5)
POTASSIUM SERPL-SCNC: 4.3 MMOL/L (ref 3.7–5.3)
RBC # BLD: 4.24 M/UL (ref 3.95–5.11)
SODIUM BLD-SCNC: 141 MMOL/L (ref 135–144)
THYROXINE, FREE: 1.7 NG/DL (ref 0.93–1.7)
TOTAL PROTEIN: 7.2 G/DL (ref 6.4–8.3)
TRIGL SERPL-MCNC: 74 MG/DL
TSH SERPL DL<=0.05 MIU/L-ACNC: 0.82 UIU/ML (ref 0.3–5)
WBC # BLD: 5.1 K/UL (ref 3.5–11.3)

## 2023-10-20 ENCOUNTER — HOSPITAL ENCOUNTER (OUTPATIENT)
Age: 68
Setting detail: SPECIMEN
Discharge: HOME | End: 2023-10-20

## 2023-10-20 ENCOUNTER — OFFICE VISIT (OUTPATIENT)
Dept: FAMILY MEDICINE CLINIC | Age: 68
End: 2023-10-20
Payer: MEDICARE

## 2023-10-20 VITALS
HEIGHT: 66 IN | SYSTOLIC BLOOD PRESSURE: 106 MMHG | OXYGEN SATURATION: 98 % | DIASTOLIC BLOOD PRESSURE: 71 MMHG | HEART RATE: 74 BPM | TEMPERATURE: 98 F | WEIGHT: 156 LBS | BODY MASS INDEX: 25.07 KG/M2

## 2023-10-20 DIAGNOSIS — R09.89 SYMPTOMS OF UPPER RESPIRATORY INFECTION (URI): Primary | ICD-10-CM

## 2023-10-20 DIAGNOSIS — Z20.822 SUSPECTED COVID-19 VIRUS INFECTION: ICD-10-CM

## 2023-10-20 DIAGNOSIS — R09.89 SYMPTOMS OF UPPER RESPIRATORY INFECTION (URI): ICD-10-CM

## 2023-10-20 PROCEDURE — G8427 DOCREV CUR MEDS BY ELIG CLIN: HCPCS | Performed by: NURSE PRACTITIONER

## 2023-10-20 PROCEDURE — G8419 CALC BMI OUT NRM PARAM NOF/U: HCPCS | Performed by: NURSE PRACTITIONER

## 2023-10-20 PROCEDURE — 1036F TOBACCO NON-USER: CPT | Performed by: NURSE PRACTITIONER

## 2023-10-20 PROCEDURE — 1090F PRES/ABSN URINE INCON ASSESS: CPT | Performed by: NURSE PRACTITIONER

## 2023-10-20 PROCEDURE — G8484 FLU IMMUNIZE NO ADMIN: HCPCS | Performed by: NURSE PRACTITIONER

## 2023-10-20 PROCEDURE — 3017F COLORECTAL CA SCREEN DOC REV: CPT | Performed by: NURSE PRACTITIONER

## 2023-10-20 PROCEDURE — 1123F ACP DISCUSS/DSCN MKR DOCD: CPT | Performed by: NURSE PRACTITIONER

## 2023-10-20 PROCEDURE — 99214 OFFICE O/P EST MOD 30 MIN: CPT | Performed by: NURSE PRACTITIONER

## 2023-10-20 PROCEDURE — G8400 PT W/DXA NO RESULTS DOC: HCPCS | Performed by: NURSE PRACTITIONER

## 2023-10-20 RX ORDER — LORAZEPAM 0.5 MG/1
0.5 TABLET ORAL EVERY 6 HOURS PRN
COMMUNITY

## 2023-10-20 RX ORDER — FLUTICASONE PROPIONATE 50 MCG
2 SPRAY, SUSPENSION (ML) NASAL DAILY
Qty: 16 G | Refills: 0 | Status: SHIPPED | OUTPATIENT
Start: 2023-10-20

## 2023-10-20 RX ORDER — CETIRIZINE HYDROCHLORIDE 10 MG/1
10 TABLET ORAL DAILY
Qty: 30 TABLET | Refills: 0 | Status: SHIPPED | OUTPATIENT
Start: 2023-10-20 | End: 2023-11-19

## 2023-10-20 ASSESSMENT — ENCOUNTER SYMPTOMS
COUGH: 1
HOARSE VOICE: 0
SINUS COMPLAINT: 1
SINUS PRESSURE: 1
SHORTNESS OF BREATH: 0
SWOLLEN GLANDS: 0
SORE THROAT: 1

## 2023-10-20 NOTE — PATIENT INSTRUCTIONS
high-quality mask when around others at home and indoors in [address] [place] [address] can still develop COVID-19 up to 10 days after you have been exposed. IF YOU TEST  Positive  Isolate immediately  1. You are ages 25 or older and completed the primary series of recommended vaccine, but have not received a recommended booster shot when eligible. 2.You received the single-dose [employer] vaccine (completing the primary series) over 2 months ago and have not received a recommended booster shot. 3.You are not vaccinated or have not completed a primary vaccine series. --If you test negative, you can leave your home, but continue to wear a well-fitting mask when around others at home and in public until 10 days after your last close contact ith   someone with COVID-19.  -If you test positive, you should isolate for at least 5 days from the date of your positive test (if you do not have symptoms). If you do develop COVID-19 symptoms, isolate for at least 5 days from the date your symptoms began (the date the symptoms started is day 0). Follow recommendations in the isolation section below.  -If you are unable to get a test 5 days after last close contact with someone with COVID-19, you can leave your home after day 5 if you have been without COVID-19 symptoms throughout the 5-day period. Wear a well-fitting mask for 10 days after your date of last close contact when around others at home and in public.  -Avoid people who are immunocompromised or at high risk for severe disease, and nursing homes and other high-risk settings, until after at least 10 days.   -If possible, stay away from people you live with, especially people who are at higher risk for getting very sick from COVID-19, as well as others outside your home throughout the full 10 days after your last close contact with someone with COVID-19.  -If you are unable to quarantine, you should wear a well-fitting mask for 10 days when around others at home and in

## 2023-10-20 NOTE — PROGRESS NOTES
[address] [facility]  [address] [address]  Dept: [phone]  Dept Fax: [phone]    Africa Cevallos is a 79 y.o. female who presents to the urgent care today for her medical conditions/complaints as notedbelow. Africa Cevallos is c/o of Sinus Problem (Sinus face pressure cough chest congestion scratchy throat ear's feel full sx started Wednesday night )      HPI:     79 yr old female presents for sinus sx, cough and congestion, HA for 2 days  Home covid tests x2, 1 positive, 1 neg, reports they  a yr ago. Called PCP office and told to come in for eval and covid testing  Covid Vaccinated? 4 doses  Hx allergies and sinuses in   [place] feverish yesterday  [place] [facility]. No issues with breathing, no chest pain    Sinus Problem  This is a new problem. The current episode started in the past 7 days (2 days). The problem has been waxing and waning since onset. There has been no fever. The pain is mild. Associated symptoms include congestion, coughing, ear pain (congested), headaches, sinus pressure and a sore throat (scratchy). Pertinent negatives include no chills, diaphoresis, hoarse voice, neck pain, shortness of breath, sneezing or swollen glands. (Gen mylagias) Past treatments include acetaminophen (sudafed PE, cough syrup). The treatment provided mild relief. Past Medical History:   Diagnosis Date    Allergic rhinitis     Anemia     iron deficiency    Anxiety     ESBL (extended spectrum beta-lactamase) producing bacteria infection 8/15/2014    E. Coli urine    Hyperlipidemia     mild    Hypothyroidism     Insomnia     MVP (mitral valve prolapse)     Osteopenia     Postmenopausal         Current Outpatient Medications   Medication Sig Dispense Refill    LORazepam (ATIVAN) 0.5 MG tablet Take 1 tablet by mouth every 6 hours as needed for Anxiety.  Max Daily Amount: 2 mg

## 2023-10-21 LAB
SARS-COV-2 RNA RESP QL NAA+PROBE: ABNORMAL
SARS-COV-2 RNA RESP QL NAA+PROBE: DETECTED
SOURCE: ABNORMAL

## 2023-12-29 PROBLEM — N81.10 BLADDER PROLAPSE, FEMALE, ACQUIRED: Status: ACTIVE | Noted: 2023-12-29

## 2024-02-12 ENCOUNTER — OFFICE VISIT (OUTPATIENT)
Dept: OBGYN CLINIC | Age: 69
End: 2024-02-12
Payer: MEDICARE

## 2024-02-12 ENCOUNTER — HOSPITAL ENCOUNTER (OUTPATIENT)
Age: 69
Setting detail: SPECIMEN
Discharge: HOME | End: 2024-02-12

## 2024-02-12 VITALS
HEIGHT: 66 IN | WEIGHT: 152 LBS | BODY MASS INDEX: 24.43 KG/M2 | DIASTOLIC BLOOD PRESSURE: 78 MMHG | SYSTOLIC BLOOD PRESSURE: 124 MMHG

## 2024-02-12 DIAGNOSIS — N81.11 CYSTOCELE, MIDLINE: Primary | ICD-10-CM

## 2024-02-12 PROCEDURE — G8427 DOCREV CUR MEDS BY ELIG CLIN: HCPCS | Performed by: NURSE PRACTITIONER

## 2024-02-12 PROCEDURE — 1036F TOBACCO NON-USER: CPT | Performed by: NURSE PRACTITIONER

## 2024-02-12 PROCEDURE — G8400 PT W/DXA NO RESULTS DOC: HCPCS | Performed by: NURSE PRACTITIONER

## 2024-02-12 PROCEDURE — 1090F PRES/ABSN URINE INCON ASSESS: CPT | Performed by: NURSE PRACTITIONER

## 2024-02-12 PROCEDURE — G8420 CALC BMI NORM PARAMETERS: HCPCS | Performed by: NURSE PRACTITIONER

## 2024-02-12 PROCEDURE — G8484 FLU IMMUNIZE NO ADMIN: HCPCS | Performed by: NURSE PRACTITIONER

## 2024-02-12 PROCEDURE — 3017F COLORECTAL CA SCREEN DOC REV: CPT | Performed by: NURSE PRACTITIONER

## 2024-02-12 PROCEDURE — 1123F ACP DISCUSS/DSCN MKR DOCD: CPT | Performed by: NURSE PRACTITIONER

## 2024-02-12 PROCEDURE — 99203 OFFICE O/P NEW LOW 30 MIN: CPT | Performed by: NURSE PRACTITIONER

## 2024-02-12 NOTE — PROGRESS NOTES
Culture     Standing Status:   Future     Standing Expiration Date:   2/12/2025     Order Specific Question:   SPECIFY(EX-CATH,MIDSTREAM,CYSTO,ETC)?     Answer:   midstream           The patient, Misa Colindres is a 68 y.o. female, was seen with a total time spent of 20 minutes for the visit on this date of service by the E/M provider. The time component had both face to face and non face to face time spent in determining the total time component.  Counseling and education regarding her diagnosis listed below and her options regarding those diagnoses were also included in determining her time component.      Diagnosis Orders   1. Cystocele, midline  Urinalysis with Reflex to Culture           The patient had her preventative health maintenance recommendations and follow-up reviewed with her at the completion of her visit.

## 2024-02-13 DIAGNOSIS — N81.11 CYSTOCELE, MIDLINE: ICD-10-CM

## 2024-02-13 LAB
BACTERIA URNS QL MICRO: NORMAL
BILIRUB UR QL STRIP: NEGATIVE
CASTS #/AREA URNS LPF: NORMAL /LPF (ref 0–8)
CLARITY UR: CLEAR
COLOR UR: YELLOW
EPI CELLS #/AREA URNS HPF: NORMAL /HPF (ref 0–5)
GLUCOSE UR STRIP-MCNC: NEGATIVE MG/DL
HGB UR QL STRIP.AUTO: NEGATIVE
KETONES UR STRIP-MCNC: NEGATIVE MG/DL
LEUKOCYTE ESTERASE UR QL STRIP: ABNORMAL
NITRITE UR QL STRIP: NEGATIVE
PH UR STRIP: 5.5 [PH] (ref 5–8)
PROT UR STRIP-MCNC: NEGATIVE MG/DL
RBC #/AREA URNS HPF: NORMAL /HPF (ref 0–4)
SP GR UR STRIP: 1.02 (ref 1–1.03)
UROBILINOGEN UR STRIP-ACNC: NORMAL EU/DL (ref 0–1)
WBC #/AREA URNS HPF: NORMAL /HPF (ref 0–5)

## 2024-04-11 ASSESSMENT — PATIENT HEALTH QUESTIONNAIRE - PHQ9
SUM OF ALL RESPONSES TO PHQ9 QUESTIONS 1 & 2: 0
1. LITTLE INTEREST OR PLEASURE IN DOING THINGS: NOT AT ALL
SUM OF ALL RESPONSES TO PHQ QUESTIONS 1-9: 0
SUM OF ALL RESPONSES TO PHQ QUESTIONS 1-9: 0
1. LITTLE INTEREST OR PLEASURE IN DOING THINGS: NOT AT ALL
SUM OF ALL RESPONSES TO PHQ QUESTIONS 1-9: 0
2. FEELING DOWN, DEPRESSED OR HOPELESS: NOT AT ALL
SUM OF ALL RESPONSES TO PHQ QUESTIONS 1-9: 0
2. FEELING DOWN, DEPRESSED OR HOPELESS: NOT AT ALL
SUM OF ALL RESPONSES TO PHQ9 QUESTIONS 1 & 2: 0

## 2024-04-12 ENCOUNTER — OFFICE VISIT (OUTPATIENT)
Dept: OBGYN CLINIC | Age: 69
End: 2024-04-12

## 2024-04-12 VITALS
BODY MASS INDEX: 24.59 KG/M2 | HEIGHT: 66 IN | HEART RATE: 82 BPM | DIASTOLIC BLOOD PRESSURE: 74 MMHG | SYSTOLIC BLOOD PRESSURE: 118 MMHG | WEIGHT: 153 LBS | RESPIRATION RATE: 18 BRPM

## 2024-04-12 DIAGNOSIS — Z96.0 PRESENCE OF PESSARY: ICD-10-CM

## 2024-04-12 DIAGNOSIS — N39.490 OVERFLOW INCONTINENCE: ICD-10-CM

## 2024-04-12 DIAGNOSIS — N81.11 CYSTOCELE, MIDLINE: Primary | ICD-10-CM

## 2024-04-12 DIAGNOSIS — N81.10 BLADDER PROLAPSE, FEMALE, ACQUIRED: ICD-10-CM

## 2024-04-12 DIAGNOSIS — Z90.710 H/O VAGINAL HYSTERECTOMY: ICD-10-CM

## 2024-04-12 NOTE — PROGRESS NOTES
the rooming process.  Chaperone: Chel        Abdomen: Soft non-tender; good bowel sounds. No guarding, rebound or rigidity. No CVA tenderness bilaterally.    Extremities: No calf tenderness, DTR 2/4, and No edema bilaterally    Pelvic: Requested   External genitalia: hair loss and fat pad loss  Urinary system: urethral meatus normal and smooth nt bladder  Vaginal: atrophic mucosa, cystocele present, 2-3 with valsalva, and No dome descensus with valsalva. No palpable mesh or erosion. Perceived Sacrospinous fixation apex right  Cervix: absent and removed surgically  Adnexa: normal bimanual exam and non palpable  Uterus: absent and removed surgically    Diagnostics:  No results found.      Lab Results:  Results for orders placed or performed during the hospital encounter of 02/12/24   Urinalysis with Reflex to Culture    Specimen: Urine, clean catch   Result Value Ref Range    Color, UA Yellow Yellow    Turbidity UA Clear Clear    Glucose, Ur NEGATIVE NEGATIVE mg/dL    Bilirubin Urine NEGATIVE NEGATIVE    Ketones, Urine NEGATIVE NEGATIVE mg/dL    Specific Gravity, UA 1.019 1.005 - 1.030    Urine Hgb NEGATIVE NEGATIVE    pH, UA 5.5 5.0 - 8.0    Protein, UA NEGATIVE NEGATIVE mg/dL    Urobilinogen, Urine Normal 0.0 - 1.0 EU/dL    Nitrite, Urine NEGATIVE NEGATIVE    Leukocyte Esterase, Urine TRACE (A) NEGATIVE   Microscopic Urinalysis   Result Value Ref Range    WBC, UA None 0 - 5 /HPF    RBC, UA None 0 - 4 /HPF    Casts UA  0 - 8 /LPF     None Reference range defined for non-centrifuged specimen.    Epithelial Cells UA 0 TO 2 0 - 5 /HPF    Bacteria, UA None None           Assessment:   Diagnosis Orders   1. Cystocele, midline 2 with valsalva  SD FIT&INSJ PESSARY/OTH INTRAVAGINAL SUPPORT ERROL    SD PESSARY RUBBER, ANY TYPE      2. Bladder prolapse, female, acquired  SD FIT&INSJ PESSARY/OTH INTRAVAGINAL SUPPORT ERROL    SD PESSARY RUBBER, ANY TYPE      3. Overflow incontinence  Urinalysis with Reflex to Culture    SD

## 2024-07-10 ENCOUNTER — HOSPITAL ENCOUNTER (OUTPATIENT)
Age: 69
Setting detail: SPECIMEN
Discharge: HOME | End: 2024-07-10

## 2024-07-10 DIAGNOSIS — E78.00 PURE HYPERCHOLESTEROLEMIA: ICD-10-CM

## 2024-07-10 DIAGNOSIS — E03.9 HYPOTHYROIDISM, UNSPECIFIED TYPE: ICD-10-CM

## 2024-07-10 DIAGNOSIS — D70.9 NEUTROPENIA, UNSPECIFIED TYPE (HCC): ICD-10-CM

## 2024-07-10 DIAGNOSIS — R73.9 HYPERGLYCEMIA: ICD-10-CM

## 2024-07-10 LAB
ALBUMIN SERPL-MCNC: 4.3 G/DL (ref 3.5–5.2)
ALBUMIN/GLOB SERPL: 1 {RATIO} (ref 1–2.5)
ALP SERPL-CCNC: 49 U/L (ref 35–104)
ALT SERPL-CCNC: 16 U/L (ref 10–35)
ANION GAP SERPL CALCULATED.3IONS-SCNC: 9 MMOL/L (ref 9–16)
AST SERPL-CCNC: 18 U/L (ref 10–35)
BASOPHILS # BLD: <0.03 K/UL (ref 0–0.2)
BASOPHILS NFR BLD: 0 % (ref 0–2)
BILIRUB SERPL-MCNC: 0.4 MG/DL (ref 0–1.2)
BUN SERPL-MCNC: 18 MG/DL (ref 8–23)
CALCIUM SERPL-MCNC: 9.7 MG/DL (ref 8.6–10.4)
CHLORIDE SERPL-SCNC: 106 MMOL/L (ref 98–107)
CHOLEST SERPL-MCNC: 194 MG/DL (ref 0–199)
CHOLESTEROL/HDL RATIO: 3
CO2 SERPL-SCNC: 27 MMOL/L (ref 20–31)
CREAT SERPL-MCNC: 0.8 MG/DL (ref 0.5–0.9)
EOSINOPHIL # BLD: 0.28 K/UL (ref 0–0.44)
EOSINOPHILS RELATIVE PERCENT: 6 % (ref 1–4)
ERYTHROCYTE [DISTWIDTH] IN BLOOD BY AUTOMATED COUNT: 13.2 % (ref 11.8–14.4)
EST. AVERAGE GLUCOSE BLD GHB EST-MCNC: 105 MG/DL
GFR, ESTIMATED: 78 ML/MIN/1.73M2
GLUCOSE SERPL-MCNC: 82 MG/DL (ref 74–99)
HBA1C MFR BLD: 5.3 % (ref 4–6)
HCT VFR BLD AUTO: 39.9 % (ref 36.3–47.1)
HDLC SERPL-MCNC: 62 MG/DL
HGB BLD-MCNC: 12.8 G/DL (ref 11.9–15.1)
IMM GRANULOCYTES # BLD AUTO: <0.03 K/UL (ref 0–0.3)
IMM GRANULOCYTES NFR BLD: 0 %
LDLC SERPL CALC-MCNC: 121 MG/DL (ref 0–100)
LYMPHOCYTES NFR BLD: 1.41 K/UL (ref 1.1–3.7)
LYMPHOCYTES RELATIVE PERCENT: 31 % (ref 24–43)
MCH RBC QN AUTO: 29.8 PG (ref 25.2–33.5)
MCHC RBC AUTO-ENTMCNC: 32.1 G/DL (ref 28.4–34.8)
MCV RBC AUTO: 92.8 FL (ref 82.6–102.9)
MONOCYTES NFR BLD: 0.46 K/UL (ref 0.1–1.2)
MONOCYTES NFR BLD: 10 % (ref 3–12)
NEUTROPHILS NFR BLD: 53 % (ref 36–65)
NEUTS SEG NFR BLD: 2.35 K/UL (ref 1.5–8.1)
NRBC BLD-RTO: 0 PER 100 WBC
PLATELET # BLD AUTO: 348 K/UL (ref 138–453)
PMV BLD AUTO: 9.4 FL (ref 8.1–13.5)
POTASSIUM SERPL-SCNC: 4.4 MMOL/L (ref 3.7–5.3)
PROT SERPL-MCNC: 7.2 G/DL (ref 6.6–8.7)
RBC # BLD AUTO: 4.3 M/UL (ref 3.95–5.11)
SODIUM SERPL-SCNC: 142 MMOL/L (ref 136–145)
T4 FREE SERPL-MCNC: 1.7 NG/DL (ref 0.92–1.68)
TRIGL SERPL-MCNC: 55 MG/DL (ref 0–149)
TSH SERPL DL<=0.05 MIU/L-ACNC: 1.93 UIU/ML (ref 0.27–4.2)
VLDLC SERPL CALC-MCNC: 11 MG/DL
WBC OTHER # BLD: 4.5 K/UL (ref 3.5–11.3)

## 2024-07-15 ENCOUNTER — HOSPITAL ENCOUNTER (OUTPATIENT)
Age: 69
Setting detail: SPECIMEN
Discharge: HOME | End: 2024-07-15

## 2024-07-15 PROBLEM — N39.490 OVERFLOW INCONTINENCE: Status: RESOLVED | Noted: 2024-04-12 | Resolved: 2024-07-15

## 2024-07-15 PROBLEM — Z96.0 PRESENCE OF PESSARY: Status: RESOLVED | Noted: 2024-04-12 | Resolved: 2024-07-15

## 2024-07-15 LAB
BILIRUB UR QL STRIP: NEGATIVE
CLARITY UR: CLEAR
COLOR UR: YELLOW
EPI CELLS #/AREA URNS HPF: NORMAL /HPF (ref 0–5)
GLUCOSE UR STRIP-MCNC: NEGATIVE MG/DL
HGB UR QL STRIP.AUTO: NEGATIVE
KETONES UR STRIP-MCNC: NEGATIVE MG/DL
LEUKOCYTE ESTERASE UR QL STRIP: ABNORMAL
NITRITE UR QL STRIP: NEGATIVE
PH UR STRIP: 6 [PH] (ref 5–8)
PROT UR STRIP-MCNC: NEGATIVE MG/DL
SP GR UR STRIP: 1 (ref 1–1.03)
UROBILINOGEN UR STRIP-ACNC: NORMAL EU/DL (ref 0–1)
WBC #/AREA URNS HPF: NORMAL /HPF (ref 0–5)

## 2025-01-15 ENCOUNTER — HOSPITAL ENCOUNTER (OUTPATIENT)
Age: 70
Setting detail: SPECIMEN
Discharge: HOME | End: 2025-01-15

## 2025-01-15 DIAGNOSIS — E03.9 HYPOTHYROIDISM, UNSPECIFIED TYPE: ICD-10-CM

## 2025-01-15 DIAGNOSIS — R39.89 URINE DISCOLORATION: ICD-10-CM

## 2025-01-15 LAB
BACTERIA URNS QL MICRO: NORMAL
BILIRUB UR QL STRIP: NEGATIVE
CASTS #/AREA URNS LPF: NORMAL /LPF (ref 0–8)
CLARITY UR: CLEAR
COLOR UR: YELLOW
EPI CELLS #/AREA URNS HPF: NORMAL /HPF (ref 0–5)
GLUCOSE UR STRIP-MCNC: NEGATIVE MG/DL
HGB UR QL STRIP.AUTO: NEGATIVE
KETONES UR STRIP-MCNC: NEGATIVE MG/DL
LEUKOCYTE ESTERASE UR QL STRIP: ABNORMAL
NITRITE UR QL STRIP: NEGATIVE
PH UR STRIP: 6 [PH] (ref 5–8)
PROT UR STRIP-MCNC: NEGATIVE MG/DL
RBC #/AREA URNS HPF: NORMAL /HPF (ref 0–4)
SP GR UR STRIP: 1.01 (ref 1–1.03)
T4 FREE SERPL-MCNC: 1.5 NG/DL (ref 0.92–1.68)
TSH SERPL DL<=0.05 MIU/L-ACNC: 1.89 UIU/ML (ref 0.27–4.2)
UROBILINOGEN UR STRIP-ACNC: NORMAL EU/DL (ref 0–1)
WBC #/AREA URNS HPF: NORMAL /HPF (ref 0–5)

## 2025-02-11 ENCOUNTER — HOSPITAL ENCOUNTER (OUTPATIENT)
Age: 70
Setting detail: SPECIMEN
Discharge: HOME | End: 2025-02-11

## 2025-02-11 DIAGNOSIS — R30.0 DYSURIA: ICD-10-CM

## 2025-02-12 LAB
BACTERIA URNS QL MICRO: NORMAL
BILIRUB UR QL STRIP: NEGATIVE
CASTS #/AREA URNS LPF: NORMAL /LPF (ref 0–8)
CLARITY UR: ABNORMAL
COLOR UR: YELLOW
EPI CELLS #/AREA URNS HPF: NORMAL /HPF (ref 0–5)
GLUCOSE UR STRIP-MCNC: NEGATIVE MG/DL
HGB UR QL STRIP.AUTO: NEGATIVE
KETONES UR STRIP-MCNC: NEGATIVE MG/DL
LEUKOCYTE ESTERASE UR QL STRIP: ABNORMAL
NITRITE UR QL STRIP: NEGATIVE
PH UR STRIP: 5.5 [PH] (ref 5–8)
PROT UR STRIP-MCNC: NEGATIVE MG/DL
RBC #/AREA URNS HPF: NORMAL /HPF (ref 0–4)
SP GR UR STRIP: 1.01 (ref 1–1.03)
UROBILINOGEN UR STRIP-ACNC: NORMAL EU/DL (ref 0–1)
WBC #/AREA URNS HPF: NORMAL /HPF (ref 0–5)

## 2025-02-14 LAB
MICROORGANISM SPEC CULT: ABNORMAL
SPECIMEN DESCRIPTION: ABNORMAL

## 2025-06-30 ENCOUNTER — OFFICE VISIT (OUTPATIENT)
Dept: ORTHOPEDIC SURGERY | Age: 70
End: 2025-06-30
Payer: MEDICARE

## 2025-06-30 VITALS — WEIGHT: 161 LBS | BODY MASS INDEX: 26.82 KG/M2 | RESPIRATION RATE: 18 BRPM | HEIGHT: 65 IN

## 2025-06-30 DIAGNOSIS — M25.551 RIGHT HIP PAIN: Primary | ICD-10-CM

## 2025-06-30 PROCEDURE — G8419 CALC BMI OUT NRM PARAM NOF/U: HCPCS | Performed by: ORTHOPAEDIC SURGERY

## 2025-06-30 PROCEDURE — 1036F TOBACCO NON-USER: CPT | Performed by: ORTHOPAEDIC SURGERY

## 2025-06-30 PROCEDURE — G8428 CUR MEDS NOT DOCUMENT: HCPCS | Performed by: ORTHOPAEDIC SURGERY

## 2025-06-30 PROCEDURE — 3017F COLORECTAL CA SCREEN DOC REV: CPT | Performed by: ORTHOPAEDIC SURGERY

## 2025-06-30 PROCEDURE — 1123F ACP DISCUSS/DSCN MKR DOCD: CPT | Performed by: ORTHOPAEDIC SURGERY

## 2025-06-30 PROCEDURE — 1125F AMNT PAIN NOTED PAIN PRSNT: CPT | Performed by: ORTHOPAEDIC SURGERY

## 2025-06-30 PROCEDURE — 1090F PRES/ABSN URINE INCON ASSESS: CPT | Performed by: ORTHOPAEDIC SURGERY

## 2025-06-30 PROCEDURE — G8400 PT W/DXA NO RESULTS DOC: HCPCS | Performed by: ORTHOPAEDIC SURGERY

## 2025-06-30 PROCEDURE — 99213 OFFICE O/P EST LOW 20 MIN: CPT | Performed by: ORTHOPAEDIC SURGERY

## 2025-06-30 NOTE — PROGRESS NOTES
[facility]                   Chad Bruno M.D.            [address], [address]               [place], [place] [address]           Dept Phone: [phone]           Dept Fax:  [phone] [address] [facility]                       [address]           [place], [place] [address]          Dept Phone: [phone]           Dept Fax:  [phone]      Chief Compliant:  Chief Complaint   Patient presents with    Pain     RT HIP        History of Present Illness:  This is a 70 y.o. female who presents to the clinic today for evaluation / follow up of right hip pain.  Patient is a 62-year-old female who I saw several years ago for left hip pain.  She had an MRI at the time which indicated acetabular labral degeneration and tearing.  She had YISEL based on her x-rays as well as MRI she has had 2 intra-articular injections per interventional radiology which did extremely well for her.  She really is asymptomatic on the left side she is developing identical symptoms on the right side with pain in the groin on certain motions with some radiation down the inner thigh.  Denies any back pain.  She has not had an MRI of the side.       Review of Systems   Constitutional: Negative for fever, chills, sweats.   Eyes: Negative for changes in vision, or pain.   HENT: Negative for ear ache, epistaxis, or sore throat.  Respiratory/Cardio: Negative for Chest pain, palpitations, SOB, or cough.  Gastrointestinal: Negative for abdominal pain, N/V/D.   Genitourinary: Negative for dysuria, frequency, urgency, or hematuria.   Neurological: Negative for headache, numbness, or weakness.   Integumentary: Negative for rash, itching, laceration, or abrasion.   Musculoskeletal: Positive for Pain (RT HIP)       Physical Exam:  Constitutional: Patient is oriented to person, place, and time. Patient appears well-developed and well nourished.   HENT: Negative otherwise noted  Head: Normocephalic and

## 2025-07-09 ENCOUNTER — HOSPITAL ENCOUNTER (OUTPATIENT)
Dept: INTERVENTIONAL RADIOLOGY/VASCULAR | Age: 70
Discharge: HOME | End: 2025-07-11
Payer: MEDICARE

## 2025-07-09 DIAGNOSIS — M25.551 RIGHT HIP PAIN: ICD-10-CM

## 2025-07-09 PROCEDURE — 6360000004 HC RX CONTRAST MEDICATION: Performed by: RADIOLOGY

## 2025-07-09 PROCEDURE — 77002 NEEDLE LOCALIZATION BY XRAY: CPT

## 2025-07-09 PROCEDURE — 6360000002 HC RX W HCPCS: Performed by: ORTHOPAEDIC SURGERY

## 2025-07-09 PROCEDURE — 2709999900 IR ARTHR/ASP/INJ MAJOR JT/BURSA RIGHT WO US

## 2025-07-09 PROCEDURE — 20610 DRAIN/INJ JOINT/BURSA W/O US: CPT

## 2025-07-09 RX ORDER — BUPIVACAINE HYDROCHLORIDE 5 MG/ML
4 INJECTION, SOLUTION EPIDURAL; INTRACAUDAL; PERINEURAL ONCE
Status: COMPLETED | OUTPATIENT
Start: 2025-07-09 | End: 2025-07-09

## 2025-07-09 RX ORDER — METHYLPREDNISOLONE ACETATE 80 MG/ML
80 INJECTION, SUSPENSION INTRA-ARTICULAR; INTRALESIONAL; INTRAMUSCULAR; SOFT TISSUE ONCE
Status: COMPLETED | OUTPATIENT
Start: 2025-07-09 | End: 2025-07-09

## 2025-07-09 RX ORDER — LIDOCAINE HYDROCHLORIDE 10 MG/ML
4 INJECTION, SOLUTION EPIDURAL; INFILTRATION; INTRACAUDAL; PERINEURAL ONCE
Status: COMPLETED | OUTPATIENT
Start: 2025-07-09 | End: 2025-07-09

## 2025-07-09 RX ORDER — IOPAMIDOL 612 MG/ML
INJECTION, SOLUTION INTRAVASCULAR PRN
Status: COMPLETED | OUTPATIENT
Start: 2025-07-09 | End: 2025-07-09

## 2025-07-09 RX ADMIN — IOPAMIDOL 4 ML: 612 INJECTION, SOLUTION INTRAVENOUS at 11:04

## 2025-07-09 RX ADMIN — LIDOCAINE HYDROCHLORIDE 4 ML: 10 INJECTION, SOLUTION EPIDURAL; INFILTRATION; INTRACAUDAL; PERINEURAL at 11:05

## 2025-07-09 RX ADMIN — BUPIVACAINE HYDROCHLORIDE 20 MG: 5 INJECTION, SOLUTION EPIDURAL; INTRACAUDAL; PERINEURAL at 11:05

## 2025-07-09 RX ADMIN — METHYLPREDNISOLONE ACETATE 80 MG: 80 INJECTION, SUSPENSION INTRA-ARTICULAR; INTRALESIONAL; INTRAMUSCULAR; SOFT TISSUE at 11:05

## 2025-07-09 NOTE — PROGRESS NOTES
Patient tolerated right hip injection without distress. Dressing to site. Discharge instructions given, no questions at this time. Patient discharged home via private auto.

## 2025-07-31 ENCOUNTER — HOSPITAL ENCOUNTER (OUTPATIENT)
Age: 70
Setting detail: SPECIMEN
Discharge: HOME | End: 2025-07-31

## 2025-07-31 DIAGNOSIS — E78.00 PURE HYPERCHOLESTEROLEMIA: ICD-10-CM

## 2025-07-31 DIAGNOSIS — R73.9 HYPERGLYCEMIA: ICD-10-CM

## 2025-07-31 DIAGNOSIS — D70.9 NEUTROPENIA, UNSPECIFIED TYPE: ICD-10-CM

## 2025-07-31 DIAGNOSIS — E03.9 HYPOTHYROIDISM, UNSPECIFIED TYPE: ICD-10-CM

## 2025-07-31 LAB
ALBUMIN SERPL-MCNC: 4.2 G/DL (ref 3.5–5.2)
ALBUMIN/GLOB SERPL: 1.4 {RATIO} (ref 1–2.5)
ALP SERPL-CCNC: 46 U/L (ref 35–104)
ALT SERPL-CCNC: 15 U/L (ref 10–35)
ANION GAP SERPL CALCULATED.3IONS-SCNC: 9 MMOL/L (ref 9–16)
AST SERPL-CCNC: 15 U/L (ref 10–35)
BILIRUB SERPL-MCNC: 0.4 MG/DL (ref 0–1.2)
BUN SERPL-MCNC: 21 MG/DL (ref 8–23)
CALCIUM SERPL-MCNC: 9.9 MG/DL (ref 8.6–10.4)
CHLORIDE SERPL-SCNC: 107 MMOL/L (ref 98–107)
CHOLEST SERPL-MCNC: 209 MG/DL (ref 0–199)
CHOLESTEROL/HDL RATIO: 3.4
CO2 SERPL-SCNC: 27 MMOL/L (ref 20–31)
CREAT SERPL-MCNC: 0.9 MG/DL (ref 0.6–0.9)
ERYTHROCYTE [DISTWIDTH] IN BLOOD BY AUTOMATED COUNT: 13.8 % (ref 11.8–14.4)
EST. AVERAGE GLUCOSE BLD GHB EST-MCNC: 108 MG/DL
GFR, ESTIMATED: 70 ML/MIN/1.73M2
GLUCOSE SERPL-MCNC: 85 MG/DL (ref 74–99)
HBA1C MFR BLD: 5.4 % (ref 4–6)
HCT VFR BLD AUTO: 41.4 % (ref 36.3–47.1)
HDLC SERPL-MCNC: 61 MG/DL
HGB BLD-MCNC: 13.2 G/DL (ref 11.9–15.1)
LDLC SERPL CALC-MCNC: 135 MG/DL (ref 0–100)
MCH RBC QN AUTO: 29.9 PG (ref 25.2–33.5)
MCHC RBC AUTO-ENTMCNC: 31.9 G/DL (ref 28.4–34.8)
MCV RBC AUTO: 93.7 FL (ref 82.6–102.9)
NRBC BLD-RTO: 0 PER 100 WBC
PLATELET # BLD AUTO: 354 K/UL (ref 138–453)
PMV BLD AUTO: 9.3 FL (ref 8.1–13.5)
POTASSIUM SERPL-SCNC: 5 MMOL/L (ref 3.7–5.3)
PROT SERPL-MCNC: 7.2 G/DL (ref 6.6–8.7)
RBC # BLD AUTO: 4.42 M/UL (ref 3.95–5.11)
SODIUM SERPL-SCNC: 143 MMOL/L (ref 136–145)
T4 FREE SERPL-MCNC: 1.5 NG/DL (ref 0.92–1.68)
TRIGL SERPL-MCNC: 67 MG/DL
TSH SERPL DL<=0.05 MIU/L-ACNC: 3.07 UIU/ML (ref 0.27–4.2)
VLDLC SERPL CALC-MCNC: 13 MG/DL (ref 1–30)
WBC OTHER # BLD: 5.2 K/UL (ref 3.5–11.3)

## 2025-09-04 ENCOUNTER — OFFICE VISIT (OUTPATIENT)
Dept: ORTHOPEDIC SURGERY | Age: 70
End: 2025-09-04

## 2025-09-04 VITALS — BODY MASS INDEX: 26.82 KG/M2 | WEIGHT: 161 LBS | HEIGHT: 65 IN | RESPIRATION RATE: 18 BRPM

## 2025-09-04 DIAGNOSIS — M25.552 LEFT HIP PAIN: Primary | ICD-10-CM
